# Patient Record
Sex: FEMALE | Race: WHITE | NOT HISPANIC OR LATINO | URBAN - METROPOLITAN AREA
[De-identification: names, ages, dates, MRNs, and addresses within clinical notes are randomized per-mention and may not be internally consistent; named-entity substitution may affect disease eponyms.]

---

## 2023-04-28 NOTE — PROGRESS NOTES
Colon and Rectal Surgery   Jodie Samuels 58 y o  female MRN: [id number]   Encounter: [id number]  04/28/23   10:03 AM        ASSESSMENT:        PLAN:        HPI  Jodie Samuels is a 58 y o  female who is here today for a second opinion  The patient had hemorrhoidectomy on 1/12/2023 with Dr Riky Glover  High risk screening colonoscopy on 12/7/2022 with Dr Kyle Rodriges showed diverticulosis with a 5 year colonoscopy recall  Historical Information   No past medical history on file  No past surgical history on file  Meds/Allergies     No current outpatient medications on file  Not on File      Social History   Social History     Substance and Sexual Activity   Alcohol Use Not on file     Social History     Substance and Sexual Activity   Drug Use Not on file     Social History     Tobacco Use   Smoking Status Not on file   Smokeless Tobacco Not on file         Family History: No family history on file  Review of Systems    Objective     Current Vitals: There were no vitals filed for this visit        Physical Exam:  General:no distress  Eyes:perrla/eomi  ENT:moist mucus membranes  Neck:supple  Pulm:no increased work of breathing  CV:sinus  Abdomen:soft,nontender  Rectal:normal perianal skin/sphincter tone, no masses palpated  Extremities:no edema  Lymphatics:no neck/axillary/groin lymphadenopathy

## 2023-05-01 NOTE — PROGRESS NOTES
Colon and Rectal Surgery   Johann Mcdaniel 58 y o  female MRN: [id number]   Encounter: [id number]  05/02/23   11:15 AM        ASSESSMENT:    Marissa Jose is a 76-year-old female, she had hemorrhoidal symptoms and was recommended hemorrhoidectomy which she underwent in January of this year at [facility], 3 position excisional hemorrhoidectomy by reviewed record  She has colonoscopy current 12/2022, family history of colon polyps, 5-year recall  She is requesting second opinion at this time for ongoing pain/trouble and is switching practices  On examination, on effacement she has posterior midline fissure and pain on only effacement, digital exam deferred for fissure treatment  Did discuss with her that possibility of healing with stricture, and will still require repeat visit for digital rectal examination, if this is not able to be tolerated at the next exam then will schedule examination under anesthesia  We discussed dietary/lifestyle changes, as well as fissure treatment  PLAN:  Nifedipine ointment to anal opening 4-6times per day  High fiber diet 20-30g/day with increased fruits/vegetables/psyllium(metamucil/konsyl, handouts/samples provided)  Increased hydration noncaffeinated beverages  Warm tub soaks/handshower for comfort  Followup 3months recheck exam      HPI   Johann Mcdaniel is a 58 y o  female who is here today for a second opinion       Patient complains of a hard lump on the right side of her anus along with occasional burning mostly after bowel movements  Several times per day she has a sharp shooting pain in the rectum  The pain lasts for several seconds  Her stools are usually soft and formed  Patient states that she has 3-4 bowel movements per day  The patient had hemorrhoidectomy on 1/12/2023 with Dr Abdulkadir Morales       High risk screening colonoscopy on 12/7/2022 with Dr Felisha Day showed diverticulosis with a 5 year colonoscopy recall   Family history of colon polyps, personal history of colon polyps       Surgical History    Surgical History  Surgery Date Site/Laterality Comments   HYSTERECTOMY          TONSILLECTOMY          CHOLECYSTECTOMY          CARPAL TUNNEL RELEASE     right     COLONOSCOPY 12/07/2022 Anus/N/A Performed by Corey Parekh MD at [address] [place] [address]   Bilateral      HEMORRHOID SURGERY 1/12/2023 Anus/N/A Performed by Corey Parekh MD at [facility] History Date Comments   Asthma       Depression       Shingles       Arthritis       Sleep apnea   C-pap   Numbness of left hand       Obesity       Pulmonary embolism ([address] ) Fall 2019 Take Eliquis 5 mg   Third degree hemorrhoids       Migraines       Seasonal allergies       Mixed hyperlipidemia       Osteoarthritis of right knee       Glucose intolerance (impaired glucose tolerance)       Oral aphthae       Rectocele 1/12/2023     Deep vein thrombosis ([facility]) 2019         Meds/Allergies       Current Outpatient Medications:     Albuterol Sulfate (PROAIR HFA IN), , Disp: , Rfl:     apixaban (Eliquis) 5 mg, Take by mouth, Disp: , Rfl:     chlorhexidine (PERIDEX) 0 12 % solution, Use, Disp: , Rfl:     choline fenofibrate (TRILIPIX) 135 MG capsule, , Disp: , Rfl:     escitalopram (LEXAPRO) 20 mg tablet, , Disp: , Rfl:     fluticasone-vilanterol 200-25 mcg/actuation inhaler, Inhale 1 puff daily, Disp: , Rfl:     NIFEdipine 0 3%-lidocaine 5% rectal ointment, Apply 1 application   topically every 4 (four) hours as needed for discomfort or pain Apply a small amount to anal opening 4-6 times per day, Disp: 30 g, Rfl: 2    ACIDOPHILUS LACTOBACILLUS PO, Take 1 tablet by mouth daily, Disp: , Rfl:     Calcium Carb-Cholecalciferol (CALCIUM CARBONATE-VITAMIN D3 PO), Take by mouth, Disp: , Rfl:     Cholecalciferol 125 MCG (5000 UT) TABS, Take 5,000 Units by mouth daily, Disp: , Rfl:     COLLAGEN PO, , Disp: , Rfl:     Glucosamine-Chondroit-Vit C-Mn (GLUCOSAMINE 1500 COMPLEX PO), Take "by mouth 2 (two) times a day, Disp: , Rfl:     L-Lysine 500 MG TABS, Take by mouth, Disp: , Rfl:     Multiple Vitamin (MULTIVITAMIN ADULT PO), Take by mouth, Disp: , Rfl:     Omega-3 Fatty Acids (FISH OIL PO), Fish Oil 300 mg capsule  DAILY  8/12/15 REVIEWED, Disp: , Rfl:     Sodium Chloride-Sodium Bicarb (AYR SALINE NASAL RINSE NA), , Disp: , Rfl:     valACYclovir HCl (VALTREX PO), , Disp: , Rfl:       Allergies   Allergen Reactions    Codeine GI Intolerance, Nausea Only and Other (See Comments)     Stomach pain  Stomach pain      Statins Myalgia     Myalgia       Social History   Social History     Substance and Sexual Activity   Alcohol Use None     Social History     Substance and Sexual Activity   Drug Use Not on file     Social History     Tobacco Use   Smoking Status Not on file   Smokeless Tobacco Not on file       Family History: History reviewed  No pertinent family history  Review of Systems   Constitutional: Negative  HENT: Negative  Eyes: Negative  Respiratory: Negative  Cardiovascular: Negative  Gastrointestinal: Positive for rectal pain  Endocrine: Negative  Genitourinary: Negative  Musculoskeletal: Negative  Skin: Negative  Allergic/Immunologic: Negative  Neurological: Negative  Hematological: Negative  Psychiatric/Behavioral: Negative          Objective   Current Vitals:   Vitals:    05/02/23 1052   Weight: 122 kg (268 lb 9 6 oz)   Height: 5' 2\" (1 575 m)     Physical Exam:  General:no distress  Pulm:no increased work of breathing  Rectal:normal perianal skin, scar/pain in the posterior midline on effacement only, digital deferred for anal fissure          "

## 2023-05-02 ENCOUNTER — OFFICE VISIT (OUTPATIENT)
Age: 63
End: 2023-05-02

## 2023-05-02 VITALS — HEIGHT: 62 IN | WEIGHT: 268.6 LBS | BODY MASS INDEX: 49.43 KG/M2

## 2023-05-02 DIAGNOSIS — K60.2 ANAL FISSURE: Primary | ICD-10-CM

## 2023-05-02 RX ORDER — ESCITALOPRAM OXALATE 20 MG/1
TABLET ORAL
COMMUNITY
Start: 2023-03-15

## 2023-05-02 RX ORDER — CHLORHEXIDINE GLUCONATE 0.12 MG/ML
RINSE ORAL
COMMUNITY
Start: 2023-01-26

## 2023-05-02 RX ORDER — LYSINE 500 MG
TABLET ORAL
COMMUNITY

## 2023-05-02 RX ORDER — FLUTICASONE FUROATE AND VILANTEROL 200; 25 UG/1; UG/1
1 POWDER RESPIRATORY (INHALATION) DAILY
COMMUNITY
Start: 2020-04-25

## 2023-05-02 RX ORDER — SENNOSIDES 8.6 MG
CAPSULE ORAL 2 TIMES DAILY
COMMUNITY
End: 2023-05-02 | Stop reason: CLARIF

## 2023-05-02 NOTE — PATIENT INSTRUCTIONS
ASSESSMENT:    Herman Andrews is a 69-year-old female, she had hemorrhoidal symptoms and was recommended hemorrhoidectomy which she underwent in January of this year at [facility], 3 position excisional hemorrhoidectomy by reviewed record  She has colonoscopy current 12/2022, family history of colon polyps, 5-year recall  She is requesting second opinion at this time for ongoing pain/trouble and is switching practices  On examination, on effacement she has posterior midline fissure and pain on only effacement, digital exam deferred for fissure treatment  Did discuss with her that possibility of healing with stricture, and will still require repeat visit for digital rectal examination, if this is not able to be tolerated at the next exam then will schedule examination under anesthesia  We discussed dietary/lifestyle changes, as well as fissure treatment        PLAN:  Nifedipine ointment to anal opening 4-6times per day  High fiber diet 20-30g/day with increased fruits/vegetables/psyllium(metamucil/konsyl, handouts/samples provided)  Increased hydration noncaffeinated beverages  Warm tub soaks/handshower for comfort  Followup 3months recheck exam

## 2023-10-10 NOTE — PROGRESS NOTES
Colon and Rectal Surgery   Leland Benjamin 58 y.o. female MRN: [id number]   Encounter: [id number]  10/13/23   2:45 PM        ASSESSMENT:    Annetta Perdue returns today, last visit 5 months prior, she still has some mild pain but overall improved. Effacement shows shallow/healed posterior midline fissure, digital examination with mild stenosis and tenderness, likely post hemorrhoidectomy symptoms. We discussed continuing high-fiber diet/hydration to continue with stool bulk    PLAN:  High fiber diet/increased hydration, 20-30grams fiber per day, increased fruits/vegetables/psyllium(metamucil or konsyl 1 tbsp 1-2x/day)  Colonoscopy 2027      HPI  Leland Benjamin is a 58 y.o. female who is here today for follow up to anal fissure. Last seen in the office on 5/2/2023. High risk screening colonoscopy on 12/7/2022 with Dr. Yumi Melissa showed diverticulosis with a 5 year colonoscopy recall. Family history of colon polyps, personal history of colon polyps. The patient had hemorrhoidectomy on 1/12/2023 with Dr. Elier Vargas. Historical Information   No past medical history on file. No past surgical history on file.     Meds/Allergies       Current Outpatient Medications:     ACIDOPHILUS LACTOBACILLUS PO, Take 1 tablet by mouth daily, Disp: , Rfl:     Albuterol Sulfate (PROAIR HFA IN), , Disp: , Rfl:     apixaban (Eliquis) 5 mg, Take by mouth, Disp: , Rfl:     Calcium Carb-Cholecalciferol (CALCIUM CARBONATE-VITAMIN D3 PO), Take by mouth, Disp: , Rfl:     chlorhexidine (PERIDEX) 0.12 % solution, Use, Disp: , Rfl:     Cholecalciferol 125 MCG (5000 UT) TABS, Take 5,000 Units by mouth daily, Disp: , Rfl:     choline fenofibrate (TRILIPIX) 135 MG capsule, , Disp: , Rfl:     COLLAGEN PO, , Disp: , Rfl:     escitalopram (LEXAPRO) 20 mg tablet, , Disp: , Rfl:     fluticasone-vilanterol 200-25 mcg/actuation inhaler, Inhale 1 puff daily, Disp: , Rfl:     Glucosamine-Chondroit-Vit C-Mn (GLUCOSAMINE 1500 COMPLEX PO), Take by mouth 2 (two) times a day, Disp: , Rfl:     L-Lysine 500 MG TABS, Take by mouth, Disp: , Rfl:     Multiple Vitamin (MULTIVITAMIN ADULT PO), Take by mouth, Disp: , Rfl:     NIFEdipine 0.3%-lidocaine 5% rectal ointment, Apply 1 application. topically every 4 (four) hours as needed for discomfort or pain Apply a small amount to anal opening 4-6 times per day, Disp: 30 g, Rfl: 2    Omega-3 Fatty Acids (FISH OIL PO), Fish Oil 300 mg capsule  DAILY  8/12/15 REVIEWED, Disp: , Rfl:     Sodium Chloride-Sodium Bicarb (AYR SALINE NASAL RINSE NA), , Disp: , Rfl:     valACYclovir HCl (VALTREX PO), , Disp: , Rfl:       Allergies   Allergen Reactions    Codeine GI Intolerance, Nausea Only and Other (See Comments)     Stomach pain  Stomach pain      Statins Myalgia     Myalgia         Social History   Social History     Substance and Sexual Activity   Alcohol Use None     Social History     Substance and Sexual Activity   Drug Use Not on file     Social History     Tobacco Use   Smoking Status Never   Smokeless Tobacco Never       Family History: No family history on file. Review of Systems   Constitutional: Negative. HENT: Negative. Eyes: Negative. Respiratory: Negative. Cardiovascular: Negative. Gastrointestinal:  Positive for rectal pain. Endocrine: Negative. Genitourinary: Negative. Musculoskeletal: Negative. Skin: Negative. Allergic/Immunologic: Negative. Neurological: Negative. Hematological: Negative. Psychiatric/Behavioral: Negative.        Objective     Current Vitals:   Vitals:    10/13/23 1428   Weight: 125 kg (276 lb)   Height: 5' 2" (1.575 m)     Physical Exam:  General:no distress  Pulm:no increased work of breathing  Rectal:normal perianal skin, healed/shallow posterior midline fissure, mild anal stenosis/tender

## 2023-10-13 ENCOUNTER — OFFICE VISIT (OUTPATIENT)
Age: 63
End: 2023-10-13
Payer: COMMERCIAL

## 2023-10-13 VITALS — HEIGHT: 62 IN | WEIGHT: 276 LBS | BODY MASS INDEX: 50.79 KG/M2

## 2023-10-13 DIAGNOSIS — K60.2 ANAL FISSURE: Primary | ICD-10-CM

## 2023-10-13 PROCEDURE — 99213 OFFICE O/P EST LOW 20 MIN: CPT | Performed by: COLON & RECTAL SURGERY

## 2023-10-13 NOTE — PATIENT INSTRUCTIONS
ASSESSMENT:    Donal Fisher returns today, last visit 5 months prior, she still has some mild pain but overall improved. Effacement shows shallow/healed posterior midline fissure, digital examination with mild stenosis and tenderness, likely post hemorrhoidectomy symptoms.   We discussed continuing high-fiber diet/hydration to continue with stool bulk    PLAN:  High fiber diet/increased hydration, 20-30grams fiber per day, increased fruits/vegetables/psyllium(metamucil or konsyl 1 tbsp 1-2x/day)  Colonoscopy 2027

## 2025-06-04 ENCOUNTER — HOSPITAL ENCOUNTER (OUTPATIENT)
Facility: HOSPITAL | Age: 65
Setting detail: OUTPATIENT SURGERY
Discharge: HOME | End: 2025-06-04
Attending: PLASTIC SURGERY | Admitting: PLASTIC SURGERY
Payer: MEDICARE

## 2025-06-04 VITALS
DIASTOLIC BLOOD PRESSURE: 79 MMHG | BODY MASS INDEX: 48.83 KG/M2 | WEIGHT: 267 LBS | OXYGEN SATURATION: 96 % | RESPIRATION RATE: 18 BRPM | TEMPERATURE: 97 F | SYSTOLIC BLOOD PRESSURE: 129 MMHG | HEART RATE: 65 BPM

## 2025-06-04 RX ORDER — BUPIVACAINE HYDROCHLORIDE 2.5 MG/ML
INJECTION, SOLUTION EPIDURAL; INFILTRATION; INTRACAUDAL; PERINEURAL AS NEEDED
Status: DISCONTINUED | OUTPATIENT
Start: 2025-06-04 | End: 2025-06-04 | Stop reason: HOSPADM

## 2025-06-04 RX ORDER — ACETAMINOPHEN 325 MG/1
325 TABLET ORAL EVERY 6 HOURS PRN
Status: DISCONTINUED | OUTPATIENT
Start: 2025-06-04 | End: 2025-06-04 | Stop reason: HOSPADM

## 2025-06-04 NOTE — INTERVAL H&P NOTE
H&P reviewed. After examining the patient I find no changes in the patients condition since the H&P had been written.    Vitals:    06/04/25 0808   BP: 137/93   Pulse: 72   Resp: 16   Temp: 97.9 °F (36.6 °C)   SpO2: 96%

## 2025-06-04 NOTE — NURSING NOTE
Pt in no distress. Pt vs stable. Verbalized understanding to AVS instructions. Surgical site intact.

## 2025-06-04 NOTE — OP NOTE
OPERATIVE REPORT  PATIENT NAME: Dea Salgado    :  1960  MRN: [id number]  Pt Location:  OR ROOM 08    SURGERY DATE: 2025    Surgeons and Role:     * Theodore Cain MD - Primary    Preop Diagnosis:  Carpal tunnel syndrome, left upper limb [G56.02]    Post-Op Diagnosis Codes:     * Carpal tunnel syndrome, left upper limb [G56.02]    Procedure(s):  Left - RELEASE CARPAL TUNNEL OPEN    Operative history: The patient had a prior right carpal tunnel release with good results, but persistent having severe chronic pain in her left hand.  Nerve conduction studies showed a severe carpal tunnel syndrome.  At this time an open decompression was performed with marked tenosynovitis found around the flexor tendons in the carpal tunnel itself consistent with this diagnosis.    Operative procedure: The patient was taken to the operating placed supine on the operating table.  She was prepped and draped in usual fashion.  Anesthesia was local using Xylocaine 1% with epinephrine and Marcaine quarter percent with epinephrine.  Following this the mid palmar incision was made hemostasis was achieved with the bipolar and the subcutaneous tissues as throughout the case.  The transverse carpal ligament was divided distally to the mid palmar space and proximally to include the volar carpal ligament.  Findings were as described.  An ulnar epineurotomy was performed of the median nerve within the carpal tunnel.  Skin wounds were then closed with 4-0 nylon interrupted simple sutures.  A bulky hand dressing was placed in the usual fashion.  The patient tolerated this procedure well and was taken to the ambulatory care unit in satisfactory condition.     SIGNATURE: Theodore Cain MD  DATE: 2025  TIME: 10:01 AM

## 2025-06-04 NOTE — DISCHARGE INSTR - AVS FIRST PAGE
1.  Keep left hand elevated is much as possible  2.  Return to office in 2 days to have dressing change

## (undated) DEVICE — SOLUTION BOWL: Brand: KENDALL

## (undated) DEVICE — BETHLEHEM UNIVERSAL  MIONR EXT: Brand: CARDINAL HEALTH

## (undated) DEVICE — CAST PADDING 4 IN SYNTHETIC NON-STRL

## (undated) DEVICE — SUT ETHILON 4-0 PS-2 18 IN 1667H

## (undated) DEVICE — SYRINGE 30ML LL

## (undated) DEVICE — USED IN CONJUNCTION WITH A SYRINGE AS AN ADDITIVE DEVICE FOR ASPIRATION FROM MULTI-DOSE MEDICINE VIALS OR INJECTION INTO I.V. SYSTEMS AND PRE-SLIT SEPTUMS COVERING INJECTION SITES.: Brand: SOL-M™ BLUNT FILL NEEDLE

## (undated) DEVICE — STOCKINETTE 2P PREROLLD 6X60

## (undated) DEVICE — SYRINGE 10ML LL CONTROL TOP

## (undated) DEVICE — OCCLUSIVE GAUZE STRIP,3% BISMUTH TRIBROMOPHENATE IN PETROLATUM BLEND: Brand: XEROFORM

## (undated) DEVICE — CABLE BIPOLAR DISP MEGADYNE

## (undated) DEVICE — GAUZE SPONGES,16 PLY: Brand: CURITY

## (undated) DEVICE — CUFF TOURNIQUET 18 X 4 IN QUICK CONNECT DISP 1 BLADDER

## (undated) DEVICE — ABDOMINAL PAD: Brand: DERMACEA

## (undated) DEVICE — DRAPE SHEET THREE QUARTER

## (undated) DEVICE — NEEDLE 25G X 1 1/2

## (undated) DEVICE — ACE WRAP 4 IN UNSTERILE

## (undated) DEVICE — STERILE POLYISOPRENE POWDER-FREE SURGICAL GLOVES: Brand: PROTEXIS

## (undated) DEVICE — INTENDED FOR TISSUE SEPARATION, AND OTHER PROCEDURES THAT REQUIRE A SHARP SURGICAL BLADE TO PUNCTURE OR CUT.: Brand: BARD-PARKER ® SAFETYLOCK CARBON RIB-BACK BLADES

## (undated) DEVICE — KERLIX BANDAGE ROLL: Brand: KERLIX